# Patient Record
Sex: FEMALE | Race: BLACK OR AFRICAN AMERICAN | URBAN - METROPOLITAN AREA
[De-identification: names, ages, dates, MRNs, and addresses within clinical notes are randomized per-mention and may not be internally consistent; named-entity substitution may affect disease eponyms.]

---

## 2017-10-05 ENCOUNTER — APPOINTMENT (OUTPATIENT)
Dept: URBAN - METROPOLITAN AREA CLINIC 211 | Age: 42
Setting detail: DERMATOLOGY
End: 2017-10-11

## 2017-10-05 DIAGNOSIS — L91.0 HYPERTROPHIC SCAR: ICD-10-CM

## 2017-10-05 DIAGNOSIS — D485 NEOPLASM OF UNCERTAIN BEHAVIOR OF SKIN: ICD-10-CM

## 2017-10-05 PROBLEM — D48.5 NEOPLASM OF UNCERTAIN BEHAVIOR OF SKIN: Status: ACTIVE | Noted: 2017-10-05

## 2017-10-05 PROCEDURE — 99202 OFFICE O/P NEW SF 15 MIN: CPT | Mod: 25

## 2017-10-05 PROCEDURE — 11900 INJECT SKIN LESIONS </W 7: CPT

## 2017-10-05 PROCEDURE — OTHER INTRALESIONAL KENALOG: OTHER

## 2017-10-05 PROCEDURE — OTHER DEFER: OTHER

## 2017-10-05 PROCEDURE — OTHER MIPS QUALITY: OTHER

## 2017-10-05 PROCEDURE — OTHER COUNSELING: OTHER

## 2017-10-05 ASSESSMENT — LOCATION DETAILED DESCRIPTION DERM
LOCATION DETAILED: PERIUMBILICAL SKIN
LOCATION DETAILED: SUBXIPHOID
LOCATION DETAILED: MID POSTERIOR NECK

## 2017-10-05 ASSESSMENT — LOCATION SIMPLE DESCRIPTION DERM
LOCATION SIMPLE: POSTERIOR NECK
LOCATION SIMPLE: ABDOMEN

## 2017-10-05 ASSESSMENT — LOCATION ZONE DERM
LOCATION ZONE: NECK
LOCATION ZONE: TRUNK

## 2017-10-05 NOTE — PROCEDURE: INTRALESIONAL KENALOG
Include Z78.9 (Other Specified Conditions Influencing Health Status) As An Associated Diagnosis?: No
Consent: The risks of atrophy were reviewed with the patient.
Administered By (Optional): FRANCHESCA
Total Volume Injected (Ccs- Only Use Numbers And Decimals): .5
Medical Necessity Clause: This procedure was medically necessary because the lesions that were treated were:
Detail Level: Simple
X Size Of Lesion In Cm (Optional): 0
Kenalog Preparation: Kenalog
Concentration Of Solution Injected (Mg/Ml): 40.0

## 2017-10-05 NOTE — HPI: CYST
How Severe Is Your Cyst?: moderate
Is This A New Presentation, Or A Follow-Up?: Cyst
Additional History: Pt notes it gets tender when she pressed on the area. It has not drained before. She notes the lesion has gotten larger over the last 2 years. \\nPain 3/10. Pt has not been screened for HCV, rec pt to follow up with her PCP.

## 2017-10-05 NOTE — HPI: SCAR (KELOIDS)
How Severe Are They?: moderate
Is This A New Presentation, Or A Follow-Up?: Scars
Additional History: Pt notes her bra rubs on the larger lesion under her bra.

## 2017-10-10 ENCOUNTER — APPOINTMENT (OUTPATIENT)
Dept: URBAN - METROPOLITAN AREA CLINIC 211 | Age: 42
Setting detail: DERMATOLOGY
End: 2017-10-10

## 2017-10-10 DIAGNOSIS — Z41.9 ENCOUNTER FOR PROCEDURE FOR PURPOSES OTHER THAN REMEDYING HEALTH STATE, UNSPECIFIED: ICD-10-CM

## 2017-10-10 DIAGNOSIS — L72.0 EPIDERMAL CYST: ICD-10-CM

## 2017-10-10 PROCEDURE — OTHER MIPS QUALITY: OTHER

## 2017-10-10 PROCEDURE — OTHER COSMETIC CONSULTATION: COOLSCULPTING: OTHER

## 2017-10-10 PROCEDURE — OTHER EXCISION (NO PATHOLOGY): OTHER

## 2017-10-10 PROCEDURE — OTHER OTHER (COSMETIC): OTHER

## 2017-10-10 PROCEDURE — 11422 EXC H-F-NK-SP B9+MARG 1.1-2: CPT

## 2017-10-10 ASSESSMENT — LOCATION SIMPLE DESCRIPTION DERM: LOCATION SIMPLE: POSTERIOR NECK

## 2017-10-10 ASSESSMENT — LOCATION ZONE DERM: LOCATION ZONE: NECK

## 2017-10-10 ASSESSMENT — LOCATION DETAILED DESCRIPTION DERM: LOCATION DETAILED: MID POSTERIOR NECK

## 2017-10-10 NOTE — PROCEDURE: OTHER (COSMETIC)
Other (Free Text): Pt is interested in treatment for her upper and lower flanks. She has had lipo and a tummy tuck prior and has a bulge on the Rt lower abdomen. Per [facility], rec pt to follow up with Dr. Dangelo for treatment opts. After assessing the pt for the flanks, it was determined she was a candidate for CoolSculpting. The following recommendation was made: \\n\\n1. CoolCurve Advantage overlapped x 2 on the Lt/Rt upper and lower flank. Rec pt to start with 1 tx however additional maybe needed to reach max improvement. \\n\\nThe treatment was discussed with pt in detail. Pre/Post treatment expectation is reviewed with pt. All questions are addressed and pt was given a CQ.
Detail Level: Zone

## 2017-10-10 NOTE — PROCEDURE: MIPS QUALITY
Quality 400a: One-Time Screening For Hepatitis C Virus (Hcv) For All Patients: Documentation of patient reason(s) for not receiving one-time screening for HCV infection
Quality 130: Documentation Of Current Medications In The Medical Record: Current Medications Documented
Quality 226: Preventive Care And Screening: Tobacco Use: Screening And Cessation Intervention: Patient screened for tobacco and never smoked
Detail Level: Detailed
Quality 110: Preventive Care And Screening: Influenza Immunization: Influenza Immunization not Administered because Patient Refused.
Quality 131: Pain Assessment And Follow-Up: Pain assessment documented as positive using a standardized tool AND a follow-up plan is documented
Quality 431: Preventive Care And Screening: Unhealthy Alcohol Use - Screening: Patient screened for unhealthy alcohol use using a single question and scores less than 2 times per year

## 2017-10-10 NOTE — HPI: OTHER
Condition:: Cosmetic
Please Describe Your Condition:: Pt presents today for a Cosmetic Consultation. Pt is interested in treatment for permanent fat loss. The pt is interested in the following: Zeltiq\\nPain 0/10. Pt has not been screened for HCV, rec pt to follow up with her PCP.

## 2017-10-10 NOTE — PROCEDURE: EXCISION (NO PATHOLOGY)
Fusiform Excision Additional Text (Leave Blank If You Do Not Want): The margin was drawn around the clinically apparent lesion.  A fusiform shape was then drawn on the skin incorporating the lesion and margins.  Incisions were then made along these lines to the appropriate tissue plane and the lesion was extirpated.
Epidermal Sutures: steri-strips
Saucerization Excision Additional Text (Leave Blank If You Do Not Want): The margin was drawn around the clinically apparent lesion.  Incisions were then made along these lines, in a tangential fashion, to the appropriate tissue plane and the lesion was extirpated.
Primary Defect Width (In Cm): 0
Medical Necessity Clause: This procedure was medically necessary because the lesion that was treated was:
Estimated Blood Loss (Cc): minimal
Render Post-Care Instructions In Note?: yes
Detail Level: Detailed
Scalpel Size: 11 blade
Excision Depth: adipose tissue
Intermediate / Complex Repair - Final Wound Length In Cm: 1.2
Include Z78.9 (Other Specified Conditions Influencing Health Status) As An Associated Diagnosis?: No
Consent was obtained from the patient. The risks and benefits to therapy were discussed in detail. Specifically, the risks of infection, scarring, bleeding, prolonged wound healing, incomplete removal, allergy to anesthesia, nerve injury and recurrence were addressed. Prior to the procedure, the treatment site was clearly identified and confirmed by the patient. All components of Universal Protocol/PAUSE Rule completed.
Hemostasis: Electrocautery
Anesthesia Volume In Cc: 5
Excisional Biopsy Additional Text (Leave Blank If You Do Not Want): The margin was drawn around the clinically apparent lesion.  An elliptical shape was then drawn on the skin incorporating the lesion and margins.  Incisions were then made along these lines to the appropriate tissue plane and the lesion was extirpated.
Wound Care: Vaseline
Dressing: dry sterile dressing
Complex Repair Preamble Text (Leave Blank If You Do Not Want): Extensive wide undermining was performed.
Size Of Lesion In Cm: 1
Anesthesia Type: 1% lidocaine with epinephrine
Post-Care Instructions: I reviewed with the patient in detail post-care instructions. Patient is not to engage in any heavy lifting, exercise, or swimming for the next 14 days. Should the patient develop any fevers, chills, bleeding, severe pain patient will contact the office immediately.
Excision Method: Slit
Additional Anesthesia Volume In Cc: 6
Slit Excision Additional Text (Leave Blank If You Do Not Want): A linear line was drawn on the skin overlying the lesion. An incision was made slowly until the lesion was visualized.  Once visualized, the lesion was removed with blunt dissection.
Repair Type: None
Size Of Margin In Cm: 0.2
Epidermal Closure: simple interrupted
Intermediate Repair Preamble Text (Leave Blank If You Do Not Want): Undermining was performed with blunt dissection.
Medical Necessity Information: It is in your best interest to select a reason for this procedure from the list below. All of these items fulfill various CMS LCD requirements except lesion extends to a margin.

## 2017-10-10 NOTE — PROCEDURE: MIPS QUALITY
Quality 110: Preventive Care And Screening: Influenza Immunization: Influenza Immunization not Administered because Patient Refused.
Detail Level: Detailed
Quality 431: Preventive Care And Screening: Unhealthy Alcohol Use - Screening: Patient screened for unhealthy alcohol use using a single question and scores less than 2 times per year
Quality 131: Pain Assessment And Follow-Up: Pain assessment documented as positive using a standardized tool AND a follow-up plan is documented
Quality 226: Preventive Care And Screening: Tobacco Use: Screening And Cessation Intervention: Patient screened for tobacco and never smoked
Quality 265: Biopsy Follow-Up: Biopsy results reviewed, communicated, tracked, and documented
Quality 400a: One-Time Screening For Hepatitis C Virus (Hcv) For All Patients: Documentation of patient reason(s) for not receiving one-time screening for HCV infection
Quality 358: Patient-Centered Surgical Risk Assessment And Communication: Documentation of patient-specific risk assessment with a risk calculator based on multi-institutional clinical data, the specific risk calculator used, and communication of risk assessment from risk calculator with the patient or family.
Quality 130: Documentation Of Current Medications In The Medical Record: Current Medications Documented

## 2020-02-19 ENCOUNTER — APPOINTMENT (OUTPATIENT)
Dept: URBAN - METROPOLITAN AREA CLINIC 220 | Age: 45
Setting detail: DERMATOLOGY
End: 2020-02-20

## 2020-02-19 DIAGNOSIS — L98.8 OTHER SPECIFIED DISORDERS OF THE SKIN AND SUBCUTANEOUS TISSUE: ICD-10-CM

## 2020-02-19 PROCEDURE — OTHER MIPS QUALITY: OTHER

## 2020-02-19 PROCEDURE — OTHER BOTOX: OTHER
